# Patient Record
Sex: FEMALE | Race: WHITE | NOT HISPANIC OR LATINO | Employment: FULL TIME | URBAN - METROPOLITAN AREA
[De-identification: names, ages, dates, MRNs, and addresses within clinical notes are randomized per-mention and may not be internally consistent; named-entity substitution may affect disease eponyms.]

---

## 2023-10-30 ENCOUNTER — HOSPITAL ENCOUNTER (OUTPATIENT)
Dept: RADIOLOGY | Facility: HOSPITAL | Age: 43
Discharge: HOME | End: 2023-10-30
Payer: COMMERCIAL

## 2023-10-30 VITALS — BODY MASS INDEX: 22.16 KG/M2 | HEIGHT: 65 IN | WEIGHT: 133 LBS

## 2023-10-30 DIAGNOSIS — Z12.31 SCREENING MAMMOGRAM FOR BREAST CANCER: ICD-10-CM

## 2023-10-30 PROCEDURE — 77067 SCR MAMMO BI INCL CAD: CPT

## 2023-11-29 ENCOUNTER — TRANSCRIBE ORDERS (OUTPATIENT)
Dept: ORTHOPEDIC SURGERY | Facility: HOSPITAL | Age: 43
End: 2023-11-29
Payer: COMMERCIAL

## 2023-11-29 DIAGNOSIS — M54.2 CERVICAL PAIN: ICD-10-CM

## 2023-12-01 ENCOUNTER — ANCILLARY PROCEDURE (OUTPATIENT)
Dept: RADIOLOGY | Facility: CLINIC | Age: 43
End: 2023-12-01
Payer: COMMERCIAL

## 2023-12-01 ENCOUNTER — OFFICE VISIT (OUTPATIENT)
Dept: ORTHOPEDIC SURGERY | Facility: CLINIC | Age: 43
End: 2023-12-01
Payer: COMMERCIAL

## 2023-12-01 DIAGNOSIS — M54.12 CERVICAL RADICULOPATHY: ICD-10-CM

## 2023-12-01 DIAGNOSIS — M54.2 CERVICAL PAIN: ICD-10-CM

## 2023-12-01 PROCEDURE — 1036F TOBACCO NON-USER: CPT | Performed by: ORTHOPAEDIC SURGERY

## 2023-12-01 PROCEDURE — 72050 X-RAY EXAM NECK SPINE 4/5VWS: CPT | Performed by: STUDENT IN AN ORGANIZED HEALTH CARE EDUCATION/TRAINING PROGRAM

## 2023-12-01 PROCEDURE — 99214 OFFICE O/P EST MOD 30 MIN: CPT | Performed by: ORTHOPAEDIC SURGERY

## 2023-12-01 PROCEDURE — 72050 X-RAY EXAM NECK SPINE 4/5VWS: CPT

## 2023-12-01 RX ORDER — BUPROPION HYDROCHLORIDE 300 MG/1
300 TABLET ORAL
COMMUNITY

## 2023-12-01 RX ORDER — TRIAMTERENE AND HYDROCHLOROTHIAZIDE 75; 50 MG/1; MG/1
1.5 TABLET ORAL DAILY
COMMUNITY

## 2023-12-01 ASSESSMENT — PAIN - FUNCTIONAL ASSESSMENT: PAIN_FUNCTIONAL_ASSESSMENT: 0-10

## 2023-12-01 ASSESSMENT — PAIN SCALES - GENERAL: PAINLEVEL_OUTOF10: 7

## 2023-12-04 NOTE — PROGRESS NOTES
HPI:Yaquelin Zarate is a 43-year-old woman, who comes in today for follow-up.  She has a past medical history significant for prior C4-6 ACDF.  She did very well with that surgery.  However 2 months ago, she started getting some intermittent symptoms in the left arm in a C7 and C8 distribution.  She has been getting some massage but she has not had any physical therapy or traction.      ROS:  Reviewed on EMR and patient intake sheet.    PMH/SH:   Reviewed on EMR and patient intake sheet.    Exam:  Physical Exam    Constitutional: Well appearing; no acute distress  Eyes: pupils are equal and round  Psych: normal affect  Respiratory: non-labored breathing  Cardiovascular: regular rate and rhythm  GI: non-distended abdomen  Musculoskeletal: no pain with range of motion of the shoulders bilaterally; no signs of impingement  Neurologic: [4]/5 strength in the upper extremities bilaterally]; [negative Moore's]; [no hyper-reflexia]; negative Spurling's    Radiology:  X-rays demonstrate stable C4-6 ACDF.    Diagnosis:  Cervical radiculopathy    Assessment and Plan:   43-year-old woman, with cervical radiculopathy secondary to symptomatic adjacent segment disease.  I recommended physical therapy and traction.  I am hopeful that this will calm down.  However, if it does not, then she will need an MRI and follow-up.    The patient was in agreement with the plan. At the end of the visit today, the patient felt that all questions had been answered satisfactorily.  The patient was pleased with the visit and very appreciative for the care rendered.     Thank you very much for the kind referral.  It is a privilege, and a pleasure, to partner with you in the care of your patients.  I would be delighted to assist you with any further consultations as needed.  Please feel free to have your patients refer to my website, [email or web address], for patient education materials regarding treatment options for common spinal  conditions.        Marvel Daily MD    Chief of Spine Surgery, [facility]  Director of Spine Service, [facility]  , Department of Orthopaedics  [employer]  [id number] [place] [address]  [email or web address]  P: [phone]    This note was dictated with voice recognition software.  It has not been proofread for grammatical errors, typographical mistakes or other semantic inconsistencies.

## 2023-12-18 ENCOUNTER — APPOINTMENT (OUTPATIENT)
Dept: PHYSICAL THERAPY | Facility: CLINIC | Age: 43
End: 2023-12-18
Payer: COMMERCIAL

## 2023-12-20 ENCOUNTER — APPOINTMENT (OUTPATIENT)
Dept: PHYSICAL THERAPY | Facility: CLINIC | Age: 43
End: 2023-12-20
Payer: COMMERCIAL

## 2023-12-27 ENCOUNTER — APPOINTMENT (OUTPATIENT)
Dept: PHYSICAL THERAPY | Facility: CLINIC | Age: 43
End: 2023-12-27
Payer: COMMERCIAL

## 2024-01-02 ENCOUNTER — APPOINTMENT (OUTPATIENT)
Dept: PHYSICAL THERAPY | Facility: CLINIC | Age: 44
End: 2024-01-02
Payer: COMMERCIAL

## 2024-01-04 ENCOUNTER — APPOINTMENT (OUTPATIENT)
Dept: PHYSICAL THERAPY | Facility: CLINIC | Age: 44
End: 2024-01-04
Payer: COMMERCIAL

## 2024-01-08 ENCOUNTER — APPOINTMENT (OUTPATIENT)
Dept: PHYSICAL THERAPY | Facility: CLINIC | Age: 44
End: 2024-01-08
Payer: COMMERCIAL

## 2024-01-10 ENCOUNTER — APPOINTMENT (OUTPATIENT)
Dept: PHYSICAL THERAPY | Facility: CLINIC | Age: 44
End: 2024-01-10
Payer: COMMERCIAL

## 2024-08-28 ENCOUNTER — HOSPITAL ENCOUNTER (OUTPATIENT)
Dept: RADIOLOGY | Facility: CLINIC | Age: 44
Discharge: HOME | End: 2024-08-28
Payer: COMMERCIAL

## 2024-08-28 ENCOUNTER — APPOINTMENT (OUTPATIENT)
Dept: ORTHOPEDIC SURGERY | Facility: CLINIC | Age: 44
End: 2024-08-28
Payer: COMMERCIAL

## 2024-08-28 DIAGNOSIS — S73.192A ACETABULAR LABRUM TEAR, LEFT, INITIAL ENCOUNTER: ICD-10-CM

## 2024-08-28 DIAGNOSIS — M25.852 LEFT HIP IMPINGEMENT SYNDROME: Primary | ICD-10-CM

## 2024-08-28 DIAGNOSIS — M25.552 HIP PAIN, LEFT: ICD-10-CM

## 2024-08-28 PROCEDURE — 99204 OFFICE O/P NEW MOD 45 MIN: CPT | Performed by: FAMILY MEDICINE

## 2024-08-28 PROCEDURE — 73502 X-RAY EXAM HIP UNI 2-3 VIEWS: CPT | Mod: LEFT SIDE | Performed by: RADIOLOGY

## 2024-08-28 PROCEDURE — 73502 X-RAY EXAM HIP UNI 2-3 VIEWS: CPT | Mod: LT

## 2024-08-28 NOTE — PROGRESS NOTES
** Please excuse any errors in grammar or translation related to this dictation. Voice recognition software was utilized to prepare this document. **    Assessment & Plan:  Exam findings most consistent with femoroacetabular impingement.  Informed patient that she has cam morphology is present bilaterally which are likely caused by her congenital hip dysplasia.  Discussed with patient that over time these can cause labral tearing and arthritic change.  We discussed options for management moving forward to include completion of intra-articular steroid injection for both diagnostic and therapeutic purposes, obtaining MR arthrogram to assess for labral tearing, and/or referral to hip sports surgeon to discuss further intervention.  She is adamant that she does not want steroid injection today as her mother had a bad reaction to one previously.  She would prefer to have MR completed to assess her condition first and then decide between injection versus surgical referral.  I will notify patient of results once available and we will update plan of care at that point.  All questions answered and patient agrees plan of care.      Chief complaint:  Left hip pain    HPI:  45 y/o patient, hx of spinal fusion and developmental dysplasia of hip , presents with left hip pain.  She states the hip pain has been present chronically for many years but at much less severity.  Over the past 4-5 months pain has been progressively increasing.    Pain is most prominent at groin however is also started to affect her buttock and lower back as she feels she is adjusting her gait.   Symptoms are aggravated by walking, stairs, sitting for extended periods of time, hip rotation, putting all of her weight on left leg. To date, patient has tried a variety of treatments to include aleve, biofreeze and working with her  on hip strengthening without improvement.  Denies previous surgery to this site.     Of note her daughter had bilateral hip  arthroscopy completed by Dr. Morrisno for hip impingement.      There is no problem list on file for this patient.    Past Surgical History:   Procedure Laterality Date    BREAST BIOPSY Left 2014    benign    HYSTERECTOMY      29 yrs old     Current Outpatient Medications on File Prior to Visit   Medication Sig Dispense Refill    buPROPion XL (Wellbutrin XL) 300 mg 24 hr tablet Take 1 tablet (300 mg) by mouth once daily in the morning. Take before meals.      triamterene-hydrochlorothiazid (Maxzide) 75-50 mg tablet Take 1.5 tablets by mouth once daily.       No current facility-administered medications on file prior to visit.       Exam:  LEFT Hip Exam:  [Normal gait]  No warmth, erythema or ecchymosis overlying.  Active flexion >90 degrees. Limited IR, ER due to pain.   NTTP over greater trochanter, glute tendons, proximal ITB  [5]/5 strength of hip flexion, abduction, & adduction  SILT  [ - ]Log roll pain, [ + ]FADIR, [ - ]FILEMON, [ + ]Stinchfield,  [ + ]Scour      RIGHT Hip Exam:  No warmth, erythema or ecchymosis overlying.  Active flexion >90 degrees. Normal IR, ER.   NTTP over greater trochanter, glute tendons, proximal ITB  [5]/5 strength of hip flexion, abduction, & adduction  SILT  [ - ]Log roll pain, [ - ]FADIR, [ - ]FILEMON, [ - ]Stinchfield    General Exam:  Constitutional - NAD, AAO x 3, conversing appropriately.  HEENT- Normocephalic and atraumatic. No facial deformities. Hearing grossly normal.  Lungs - Breathing non-labored with normal rate. No accessory muscle use.  CV - Extremities warm and well-perfused, brisk capillary refill present.   Neuro - CN II-XII grossly intact.      Results:  X-rays of left hip obtained 8/28/2024 personally interpreted as no acute fracture or significant degenerative changes.  Cam morphology present bilaterally.    Lab Results   Component Value Date    CREATININE 1.30 (H) 02/04/2023

## 2024-09-04 ENCOUNTER — APPOINTMENT (OUTPATIENT)
Dept: ORTHOPEDIC SURGERY | Facility: CLINIC | Age: 44
End: 2024-09-04
Payer: COMMERCIAL

## 2024-10-16 ENCOUNTER — HOSPITAL ENCOUNTER (OUTPATIENT)
Dept: RADIOLOGY | Facility: HOSPITAL | Age: 44
Discharge: HOME | End: 2024-10-16
Payer: COMMERCIAL

## 2024-10-16 DIAGNOSIS — S73.192A ACETABULAR LABRUM TEAR, LEFT, INITIAL ENCOUNTER: ICD-10-CM

## 2024-10-16 DIAGNOSIS — M25.552 HIP PAIN, LEFT: ICD-10-CM

## 2024-10-16 PROCEDURE — 73525 CONTRAST X-RAY OF HIP: CPT | Mod: LT

## 2024-10-16 PROCEDURE — A9575 INJ GADOTERATE MEGLUMI 0.1ML: HCPCS | Performed by: FAMILY MEDICINE

## 2024-10-16 PROCEDURE — 73722 MRI JOINT OF LWR EXTR W/DYE: CPT | Mod: LEFT SIDE | Performed by: RADIOLOGY

## 2024-10-16 PROCEDURE — 73722 MRI JOINT OF LWR EXTR W/DYE: CPT | Mod: LT

## 2024-10-16 PROCEDURE — 2550000001 HC RX 255 CONTRASTS: Performed by: FAMILY MEDICINE

## 2024-10-16 PROCEDURE — 2500000004 HC RX 250 GENERAL PHARMACY W/ HCPCS (ALT 636 FOR OP/ED)

## 2024-10-16 RX ORDER — LIDOCAINE HYDROCHLORIDE 10 MG/ML
INJECTION, SOLUTION INFILTRATION; PERINEURAL
Status: COMPLETED
Start: 2024-10-16 | End: 2024-10-16

## 2024-10-16 RX ORDER — GADOTERATE MEGLUMINE 376.9 MG/ML
0.1 INJECTION INTRAVENOUS
Status: COMPLETED | OUTPATIENT
Start: 2024-10-16 | End: 2024-10-16

## 2024-10-16 RX ORDER — LIDOCAINE HYDROCHLORIDE 10 MG/ML
5 INJECTION, SOLUTION EPIDURAL; INFILTRATION; INTRACAUDAL; PERINEURAL ONCE
Status: CANCELLED | OUTPATIENT
Start: 2024-10-16 | End: 2024-10-16

## 2024-10-16 NOTE — PRE-PROCEDURE NOTE
Interventional Radiology Preprocedure Note    Indication for procedure: Diagnoses of Hip pain, left and Acetabular labrum tear, left, initial encounter were pertinent to this visit.    Relevant review of systems:  Left hip pain    Relevant Labs:   Lab Results   Component Value Date    CREATININE 1.30 (H) 02/04/2023    INR 1.0 02/04/2023    PROTIME 11.9 02/04/2023       Planned Sedation/Anesthesia: local    Airway assessment: normal    Directed physical examination:    A&Ox3  Breathing Unlabored  Ambulates without assistive devices.     Plan for fl guided intraarticular injection left hip for MR arthrogram today.     Benefits, risks and alternatives of procedure and planned sedation have been discussed with the patient and/or their representative. All questions answered and they agree to proceed.

## 2024-10-16 NOTE — POST-PROCEDURE NOTE
Interventional Radiology Brief Postprocedure Note    Attending: Ashlee Arnold CNP    Assistant: none    Diagnosis: Left hip pain; r/o labral tear    Description of procedure: left intraarticular hip injection under direct fluoroscopic guidance.   10 lidocaine subQ   10 of a solution of 1% lidocaine, Omnipaque, and gadolinium     Anesthesia:  Local    Complications: None    Estimated Blood Loss: none    Specimens: No    See detailed result report with images in PACS.    The patient tolerated the procedure well without incident or complication and is in stable condition.

## 2024-10-18 ENCOUNTER — HOSPITAL ENCOUNTER (OUTPATIENT)
Dept: RADIOLOGY | Facility: CLINIC | Age: 44
Discharge: HOME | End: 2024-10-18
Payer: COMMERCIAL

## 2024-10-18 ENCOUNTER — OFFICE VISIT (OUTPATIENT)
Dept: ORTHOPEDIC SURGERY | Facility: CLINIC | Age: 44
End: 2024-10-18
Payer: COMMERCIAL

## 2024-10-18 DIAGNOSIS — M25.552 HIP PAIN, LEFT: ICD-10-CM

## 2024-10-18 DIAGNOSIS — M25.852 LEFT HIP IMPINGEMENT SYNDROME: ICD-10-CM

## 2024-10-18 PROCEDURE — 99214 OFFICE O/P EST MOD 30 MIN: CPT | Performed by: STUDENT IN AN ORGANIZED HEALTH CARE EDUCATION/TRAINING PROGRAM

## 2024-10-18 PROCEDURE — 73502 X-RAY EXAM HIP UNI 2-3 VIEWS: CPT | Mod: LT

## 2024-10-18 NOTE — PROGRESS NOTES
Yaquelin Zarate  is a 44 y.o. year-old  female. she is a new patient to our office and presents with a chief complaint of Left  hip pain; referred by [Dr Schmidt].  The pain is been insidious in onset.  Her pain started in April after working with her .  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  She has seen Dr. Morrison several times many years ago who recommended conservative management.  The pain's location is anterior and deep in the groin and is a sharp/pinchy and achy at rest type pain. The symptoms have been treated with rest and activity modification including avoidance of hip stretching activities and deep flexion activities. NSAIDS including have also been used with minimal improvement. They have not attempted physical therapy. They have not had an intra-articular corticosteroid injection. They have not had a previous hip surgery.  She had a left hip arthrogram performed 2 days ago, which has acutely worsened her pain.  She also describes numbness and tingling extending down her left lower extremity, she has a prior history of cervical radiculopathy.     Past Medical, Family, and Social History reviewed and inlcude: Her daughter had bilateral hip scopes with labral repairs/femoroplasty     Review of Systems  A complete review of systems was conducted, pertinent only to the HPI noted above.  Constitutional: None  Eyes: No additions to above history  Ears, Nose, Throat: No additions to above history  Cardiovascular: No additions to above history  Respiratory: No additions to above history  GI: No additions to above history  : No additions to above history  Skin/Neuro: No additions to above history  Endocrine/Heme/Lymph: No additions to above history  Immunologic: No additions to above history  Psychiatric: No additions to above history  Musculoskeletal: see above  Eyes:  sclera anicteric  ENT: hearing appropriate for normal conversation, neck appears symmetric with no gross thyromegaly  Pulm: No labored breathing, no wheezing  CVS: Regular rate and rhythm  Abd: Non-distended  Skin: No rashes, erythema, or induration around hip    MUSCULOSKELETAL EXAM: HIP      Left  HIP:   IR@90: [10] degrees (limited by pain)   ER@90: [30] degrees (limited by pain)   Pain in groin with FADIR this reproduces her pain, + stinchfield, + subspine, no TTP over GT      Neurovascularly Intact to Femroal/obturator/LFCN/S/S/SPN/DPN/T nerves on bilateral extremities    Xrays independently reviewed and interpreted:    AP of the left hip and pelvis as well as [place] lateral obtained today demonstrate a cam morphology of the femoral head with an alpha angle of about 60 degrees.  The center edge angle of about 20 degrees.  Acetabular index of about 9 degrees.    MR arthrogram of the left hip obtained on October 16th 2024 and independently reviewed today demonstrates labral tear and degenerative joint disease with near full-thickness cartilage loss.  No subchondral cysts.    The patient history, physical examination and imaging studies are consistent with the diagnosis of femoroacetabular impingement (JARRED).    Options were discussed with the patient today, including continued conservative management vs. potential surgical intervention.     After a thorough discussion with the patient including expectations, I would recommend we continue a conservative program for now.  We discussed activity modification, over the counter medications including NSAIDs, and PT.    I would like to proceed with a further work up/treatment/paperwork including the following:  PT, intra-articular injection    We will see her back in [6] [weeks] after her follow-up with Dr. Schmidt for further follow up if not improved.

## 2024-10-31 ENCOUNTER — HOSPITAL ENCOUNTER (OUTPATIENT)
Dept: RADIOLOGY | Facility: HOSPITAL | Age: 44
Discharge: HOME | End: 2024-10-31
Payer: COMMERCIAL

## 2024-10-31 VITALS — BODY MASS INDEX: 21.33 KG/M2 | HEIGHT: 65 IN | WEIGHT: 128 LBS

## 2024-10-31 DIAGNOSIS — Z12.31 SCREENING MAMMOGRAM FOR BREAST CANCER: ICD-10-CM

## 2024-10-31 PROCEDURE — 77067 SCR MAMMO BI INCL CAD: CPT

## 2024-11-07 ENCOUNTER — APPOINTMENT (OUTPATIENT)
Dept: ORTHOPEDIC SURGERY | Facility: CLINIC | Age: 44
End: 2024-11-07
Payer: COMMERCIAL